# Patient Record
Sex: MALE | Race: WHITE | Employment: FULL TIME | URBAN - METROPOLITAN AREA
[De-identification: names, ages, dates, MRNs, and addresses within clinical notes are randomized per-mention and may not be internally consistent; named-entity substitution may affect disease eponyms.]

---

## 2021-08-23 ENCOUNTER — TELEPHONE (OUTPATIENT)
Dept: FAMILY MEDICINE CLINIC | Age: 46
End: 2021-08-23

## 2021-08-23 NOTE — TELEPHONE ENCOUNTER
Called and discussed with patient. Told him I could not guarantee a physician here would be able to do that unless they deem it medically necessary based off labs, records, physical exam, ect. Patient understood and will discuss with his wife about making the switch to our office.

## 2021-08-23 NOTE — TELEPHONE ENCOUNTER
----- Message from Neris Decker sent at 8/23/2021 10:27 AM EDT -----  Regarding: Dr Ksenia Velasquez: [phone]  General Message/Vendor Calls    Caller's first and last name:Tien      Reason for call:before becoming a new patient at the practice the patient wants to know if any of the doctors will do testosterone replacement? He has been on it for 5 years . Please advise.       Callback required yes/no and why:yes      Best contact number(s):[phone]      Details to clarify the request:      Neris Decker

## 2024-01-26 ENCOUNTER — HOSPITAL ENCOUNTER (OUTPATIENT)
Facility: HOSPITAL | Age: 49
Discharge: HOME | End: 2024-01-26
Payer: COMMERCIAL

## 2024-01-26 DIAGNOSIS — Z12.11 SPECIAL SCREENING FOR MALIGNANT NEOPLASMS, COLON: ICD-10-CM

## 2024-01-26 DIAGNOSIS — R10.31 RLQ ABDOMINAL PAIN: ICD-10-CM

## 2024-01-26 DIAGNOSIS — C61 MALIGNANT NEOPLASM OF PROSTATE (HCC): ICD-10-CM

## 2024-01-26 PROCEDURE — 6360000004 HC RX CONTRAST MEDICATION: Performed by: FAMILY MEDICINE

## 2024-01-26 PROCEDURE — 74177 CT ABD & PELVIS W/CONTRAST: CPT

## 2024-01-26 RX ADMIN — IOPAMIDOL 100 ML: 755 INJECTION, SOLUTION INTRAVENOUS at 18:01

## 2025-03-13 ENCOUNTER — CLINICAL DOCUMENTATION (OUTPATIENT)
Facility: HOSPITAL | Age: 50
End: 2025-03-13

## 2025-03-13 ENCOUNTER — HOSPITAL ENCOUNTER (OUTPATIENT)
Facility: HOSPITAL | Age: 50
Discharge: HOME | End: 2025-03-16

## 2025-03-13 VITALS
SYSTOLIC BLOOD PRESSURE: 165 MMHG | HEART RATE: 70 BPM | OXYGEN SATURATION: 95 % | WEIGHT: 226 LBS | DIASTOLIC BLOOD PRESSURE: 98 MMHG | BODY MASS INDEX: 31.64 KG/M2 | RESPIRATION RATE: 16 BRPM | HEIGHT: 71 IN

## 2025-03-13 DIAGNOSIS — C61 PROSTATE CANCER (HCC): Primary | ICD-10-CM

## 2025-03-13 RX ORDER — ALFUZOSIN HYDROCHLORIDE 10 MG/1
TABLET, EXTENDED RELEASE ORAL
COMMUNITY

## 2025-03-13 RX ORDER — GUSELKUMAB 100 MG/ML
INJECTION SUBCUTANEOUS
COMMUNITY

## 2025-03-13 ASSESSMENT — PAIN SCALES - GENERAL: PAINLEVEL_OUTOF10: 0

## 2025-03-13 NOTE — PROGRESS NOTES
NCCN Distress Thermometer    Radiation Oncology at [facility]    Date Screening Completed: 3/13/25    Screening Declined:  [] Yes    Number that best describes how much distress you've experienced in the past week, including today?  0 [] - No distress 1 []      2 [x]      3 []      4 []       5 []       6 []      7 []      8 []      9 []       10 [] - Extreme distress    PROBLEM LIST  Have you had concerns about any of the items below in the past week, including today?      Physical Concerns Practical Concerns   [] Pain [] Taking care of myself    [] Sleep [] Taking care of others    [] Fatigue [] Safety   [] Tobacco use  [] Work   [] Substance use  [] School   [] Memory or concentration [] Housing/Utilities   [] Sexual health [] Finances   [] Changes in eating  [] Insurance   [] Loss or change of physical abilities  [] Transportation    []    Emotional Concerns [] Having enough food   [] Worry or anxiety [] Access to medicine   [] Sadness or depression [] Treatment decisions   [] Loss of interest or enjoyment     [] Grief or loss  Spiritual or Restoration Concerns   [] Fear [] Sense of meaning or purpose   [] Loneliness  [] Changes in toy or beliefs   [] Anger [] Death, dying, or afterlife   [] Changes in appearance [] Conflict between beliefs and cancer treatments    [] Feelings of worthlessness or being a burden [] Relationship with the sacred    [] Ritual or dietary needs    Social Concerns     [] Relationship with spouse or partner     [] Relationship with children    [] Relationship with family members     [] Relationship with friends or coworkers     [] Communication with health care team     [] Ability to have children     [] Prejudice or discrimination        Other Concerns:

## 2025-03-13 NOTE — PROGRESS NOTES
Education was provided to the patient in the form of the following printed AIDAN booklet or booklets: Radiation Therapy for Prostate Cancer    Total time spent on education with patient: minutes: 5-10 minutes

## 2025-03-13 NOTE — PROGRESS NOTES
[facility]  [facility]    Radiation Oncology Consultation  Encounter Date: 03/13/25    Berny Garcia  [id number]  1975     Diagnosis   C61: [id number], iPSA 3.2, GG1 (+2/13) very low-risk prostate cancer    AJCC Staging has been reviewed.  History of Present Illness   Mr. Garcia is a 49 y.o. male seen in consultation at the request of Dr. Mendoza to assess the role of radiation for his prostate cancer.    His oncologic history is detailed below:  05/06/2023: PSA 1.8  06/14/2023: MRI prostate for abnormal JEFFREY showed a 40cc gland with PIRADS 3 lesion in the left posteromedial PZ at the mid-gland without EPE, SVI, pelvic lymphadenopathy, or aggressive osseous lesions.  07/06/2023: Fusion biopsy showed a 25.2cc gland with benign targeted core. Standard TRUS biopsy showed 3+3=6 disease in 2 cores, total 2/13 positive cores with <5% core involvement. Opted for active surveillance  10/18/2023: PSA 3.1  11/17/2023: PSA 2.0  01/19/2024: PSA 5.6  02/27/2024: PSA 2.1  06/12/2024: PSA 2.0  10/08/2024: PSA 3.2  02/18/2025: MRI prostate showed a 32cc gland with no clinically significant prostate cancer lesions. No pelvic lymphadenopathy or aggressive osseous lesions. Note of prostatitis with asymmetric atrophy of the left PZ.    Symptomatically, he has moderate urinary issues for which he recently started on alfuzosin which he feels is helping tremendously. From a performance status standpoint, he is able to complete his ADLs without issue. Mr. Garcia denies a history of inflammatory bowel disease, scleroderma or other collagen vascular diseases, pacemaker/AICD, or history of prior irradiation. Last colonoscopy was December 2023, next due at 3 years. He is due to follow up with Dr. Mendoza in 08/2025.     IPSS:  Symptom Score   0 = Not at all   Incomplete Emptying 2   1 = Less than 1 in 5   Frequency 5   2 = Less than half the time    Intermittency 3   3 = About